# Patient Record
Sex: FEMALE | Race: WHITE | Employment: FULL TIME | URBAN - METROPOLITAN AREA
[De-identification: names, ages, dates, MRNs, and addresses within clinical notes are randomized per-mention and may not be internally consistent; named-entity substitution may affect disease eponyms.]

---

## 2021-10-21 ENCOUNTER — HOSPITAL ENCOUNTER (OUTPATIENT)
Dept: PHYSICAL THERAPY | Age: 41
Discharge: HOME | End: 2021-10-21
Payer: COMMERCIAL

## 2021-10-21 PROCEDURE — 97162 PT EVAL MOD COMPLEX 30 MIN: CPT

## 2021-10-21 PROCEDURE — 97535 SELF CARE MNGMENT TRAINING: CPT

## 2021-10-21 NOTE — PROGRESS NOTES
[facility] PHYSICAL THERAPY  [address] [place] [address] 51, [address] 201,Kya Casas, 70 [facility] - Phone: [phone]  Fax: [phone] / 23[facility]  Patient Name: Sanjuanita Santos : 1980   Medical Diagnosis: Right shoulder pain [M25.511] Treatment Diagnosis: Right shoulder pain [M25.511]   Onset Date:      Referral Source: Sejal Noble MD Start of Care [facility]): 10/21/2021   Prior Hospitalization: See medical history Provider #: [id number]   Prior Level of Function: WNL without limitations   Comorbidities: Allergies, headaches, HTN, chronicity of symptoms   Medications: Verified on Patient Summary List   The Plan of Care and following information is based on the information from the initial evaluation.   ===========================================================================================  Assessment / key information:  Patient is 42 y.o. female who presents to [facility]. with diagnosis of Right shoulder pain [M25.511]. Pt reports insidious onset R sh pain starting  with worsening over the last month. Pt is RHD. Objective data detailed below. Patient scored 46 on FOTO indicating decreased function and quality of life. Pt would benefit from skilled PT services to address impairments, work towards goals, and return to PLOF. Pain: current 1/10, at worst 8/10, at best 0/10  Aggravating factors: handling baggage when traveling, sleeping, reaching, zipping, reaching behind while in car, OH movement, vacuuming, holding phone  Alleviating factors: resting, topical analgesic  Pain description: shooting, sharp  Pain location: \"in the middle\" of the shoulder jt  Radiation? Numbness/tingling?  None reported    Static Posture: ant tilt with UR and ABD R scapula  GHJ AROM(PROM): flex R 136(155) L 150, ext R 42 L 50, ABD R 717(590) with reports of tightness and stiffness L 165, ER equal and functional bilaterally, IR WFL with firm end feel noted L IR (55)  GHJ MMT: L GHJ grossly WNL, R GHJ grossly 4 except ABD 3-  Elbow MMT: grossly WNL bilat  Palpation: TTP noted during palpation of UT, lev scap, subscap, LH biceps tendon R GHJ  ===========================================================================================  Eval Complexity: History MEDIUM  Complexity : 1-2 comorbidities / personal factors will impact the outcome/ POC ;  Examination  MEDIUM Complexity : 3 Standardized tests and measures addressing body structure, function, activity limitation and / or participation in recreation ; Presentation MEDIUM Complexity : Evolving with changing characteristics ; Decision Making MEDIUM Complexity : FOTO score of 26-74; Overall Complexity MEDIUM  Problem List: pain affecting function, decrease ROM, decrease strength, edema affecting function, decrease ADL/ functional abilitiies, decrease activity tolerance, decrease flexibility/ joint mobility and decrease transfer abilities  Treatment Plan may include any combination of the following: Therapeutic exercise, Therapeutic activities, Neuromuscular re-education, Physical agent/modality, Manual therapy, Patient education, Self Care training and Functional mobility training  Patient / Family readiness to learn indicated by: asking questions, trying to perform skills and interest  Persons(s) to be included in education: patient (P)  Barriers to Learning/Limitations: None  Measures taken, if barriers to learning:    Patient Goal (s): \"Strengthen and make lifestyle adjustments to prevent further complications\"   Patient self reported health status: good  Rehabilitation Potential: good   Short Term Goals: To be accomplished in 4 weeks:  1) Pt performing HEP to assist with appt carry over and return to PLOF. 2) Pt will report 50% improvement in ability to perform UE functional tasks, ADLs.   3) Pt to demo improved R GHJ ROM flex >150, ext >50, ABD >150, func WFL without reports of tightness to facilitate UE functional tasks, ADLs. 4) Pt to increase score on FOTO to 61 indicating improved function and quality of life. 5) Pt to report >=+3 on GROC indicating clinically significant subjective functional improvement.  Long Term Goals: To be accomplished in 8 weeks:  1) Patient Independent with progressive HEP to facilitate symptom management and progression upon DC. 2) Pt to increase score on FOTO to 70 indicating improved function and quality of life. 3) Pt to demo R GHJ MMT >=4 in order to have improved functional mobility. 4) Pt to report >=+5 on GROC indicating clinically significant subjective functional improvement. Frequency / Duration: Patient to be seen  1-2  times per week for 8  weeks:  Patient / Caregiver education and instruction: other PT diagnosis, prognosis, POC  Therapist Signature: Brendan Knight PT Date: 02/55/8753   Certification Period: na Time: 9:10 AM   ===========================================================================================  I certify that the above Physical Therapy Services are being furnished while the patient is under my care. I agree with the treatment plan and certify that this therapy is necessary. Physician Signature:        Date:       Time:                                        Laura Estes MD    Please sign and return to [facility] at [facility], [facility]. or you may fax the signed copy to [phone]. Thank you.

## 2021-10-21 NOTE — PROGRESS NOTES
PHYSICAL THERAPY - DAILY TREATMENT NOTE    Patient Name: Gaurang Sesay        Date: 10/21/2021  : 1980   yes Patient  Verified  Visit #:   1   of   16  Insurance: Payor: BLUE CROSS / Plan: [facility] PPO / Product Type: PPO /      In time: 904 am Out time: 928 am   Total Treatment Time: 24     Medicare/BCBS Time Tracking (below)   Total Timed Codes (min):  10 1:1 Treatment Time:  10     TREATMENT AREA =  Right shoulder pain [M25.511]    SUBJECTIVE  Pain Level (on 0 to 10 scale):  1 / 10   Medication Changes/New allergies or changes in medical history, any new surgeries or procedures?    no  If yes, update Summary List   Subjective Functional Status/Changes:  []  No changes reported     See POC       OBJECTIVE  10 min Self Care: See pt education   Rationale:    Pt education to improve the patient's ability to adhere to PT POC    Billed With/As:  [] TE  [] TA  [] Neuro  [x] Self Care Patient Education: [x] Review HEP    [] Progressed/Changed HEP based on:   [] positioning   [] body mechanics   [] transfers   [] heat/ice application    [x] other: PT diagnosis, prognosis, POC     Other Objective/Functional Measures:    See POC     Post Treatment Pain Level (on 0 to 10) scale:    10     ASSESSMENT  Assessment/Changes in Function:     Justification for Eval Code Complexity:  Patient History: Allergies, headaches, HTN, chronicity of symptoms  Examination: See exam  Clinical Presentation: evolving  Clinical Decision Making: MEDIUM  FOTO: 52 /100     []  See Progress Note/Recertification   Patient will continue to benefit from skilled PT services to modify and progress therapeutic interventions, address functional mobility deficits, address ROM deficits, address strength deficits, analyze and address soft tissue restrictions, analyze and cue movement patterns, analyze and modify body mechanics/ergonomics and assess and modify postural abnormalities to attain remaining goals. Progress toward goals / Updated goals:    See POC     PLAN  [x]  Upgrade activities as tolerated yes Continue plan of care   []  Discharge due to :    []  Other:      Therapist: Maddie Newman PT    Date: 10/21/2021 Time: 8:45 AM     No future appointments.

## 2021-10-28 ENCOUNTER — HOSPITAL ENCOUNTER (OUTPATIENT)
Dept: PHYSICAL THERAPY | Age: 41
Discharge: HOME | End: 2021-10-28
Payer: COMMERCIAL

## 2021-10-28 PROCEDURE — 97535 SELF CARE MNGMENT TRAINING: CPT

## 2021-10-28 PROCEDURE — 97110 THERAPEUTIC EXERCISES: CPT

## 2021-10-28 NOTE — PROGRESS NOTES
PHYSICAL THERAPY - DAILY TREATMENT NOTE    Patient Name: Merline Sham        Date: 10/28/2021  : 1980   yes Patient  Verified  Visit #:   2   of   16  Insurance: Payor: BLUE CROSS / Plan: [facility] PPO / Product Type: PPO /      In time: 3:30 Out time: 410   Total Treatment Time: 40     Medicare/BCBS Time Tracking (below)   Total Timed Codes (min):  40 1:1 Treatment Time:  40     TREATMENT AREA =  Right shoulder pain [M25.511]    SUBJECTIVE  Pain Level (on 0 to 10 scale):  0 / 10   Medication Changes/New allergies or changes in medical history, any new surgeries or procedures?    no  If yes, update Summary List   Subjective Functional Status/Changes:  []  No changes reported     Reports no changes since IE. Notes last time it was really painful was over labor day weekend while vacuuming. OBJECTIVE  30 min Therapeutic Exercise:  [x]  See flow sheet   Rationale:      increase ROM, increase strength and improve coordination to improve the patients ability to reach overhead. 10 min Self Care: Pt education on HEP and postures   Rationale:    increase strength and improve coordination to improve the patients ability to reach behind back and overhead. Billed With/As:  [] TE  [] TA  [] Neuro  [x] Self Care Patient Education: [x] Review HEP    [x] Progressed/Changed HEP based on:   [x] positioning   [x] body mechanics   [] transfers   [] heat/ice application    [] other:      Other Objective/Functional Measures:    See flow sheet for exercises performed     Post Treatment Pain Level (on 0 to 10) scale:   0  / 10     ASSESSMENT  Assessment/Changes in Function:     Chart reviewed and subjective taken. Pt required heavy TC for form due to first f/u session, however corrected quickly. Good tolerance to all therex and no c/o pain.  Given HEP to progress therapy at home.     []  See Progress Note/Recertification   Patient will continue to benefit from skilled PT services to modify and progress therapeutic interventions, address functional mobility deficits, address ROM deficits, address strength deficits, analyze and address soft tissue restrictions, analyze and cue movement patterns, analyze and modify body mechanics/ergonomics and assess and modify postural abnormalities to attain remaining goals. Progress toward goals / Updated goals:    Initiated HEP today.      PLAN  [x]  Upgrade activities as tolerated yes Continue plan of care   []  Discharge due to :    []  Other:      Therapist: Flaquita Galvez PT    Date: 10/28/2021 Time: 4:45 PM     Future Appointments   Date Time Provider Matthew Nascimento   10/28/2021  3:30 PM Victoria Ashley, PT [id number]   11/1/2021  8:45 AM Clarice Adam, PT [facility]   11/4/2021  8:30 AM [facility]   11/9/2021  9:30 AM Clarice Adam, PT [facility]   11/11/2021  8:00 AM Clarice Adam, PT [facility]   11/16/2021  9:30 AM Clarice Adam, PT [facility]   11/18/2021  8:30 AM Chandra Doan [facility]   11/23/2021  9:00 AM [facility]   11/26/2021 10:15 AM Victoria Ashley PT [facility]   11/30/2021  9:15 AM Afshan Fallcharley [facility]   12/2/2021  8:30 AM Chandra Doan [facility]   12/7/2021  8:30 AM [facility]   12/9/2021  9:45 AM Clarice Adam, [facility]   12/14/2021  9:45 AM Victoria Ashley, PT [facility]   12/16/2021  9:15 AM Abhishek Wolfe [facility]

## 2021-11-01 ENCOUNTER — HOSPITAL ENCOUNTER (OUTPATIENT)
Dept: PHYSICAL THERAPY | Age: 41
Discharge: HOME | End: 2021-11-01
Payer: COMMERCIAL

## 2021-11-01 PROCEDURE — 97110 THERAPEUTIC EXERCISES: CPT

## 2021-11-01 PROCEDURE — 97530 THERAPEUTIC ACTIVITIES: CPT

## 2021-11-01 PROCEDURE — 97535 SELF CARE MNGMENT TRAINING: CPT

## 2021-11-01 NOTE — PROGRESS NOTES
PHYSICAL THERAPY - DAILY TREATMENT NOTE    Patient Name: Annika Márquez        Date: 2021  : 1980   yes Patient  Verified  Visit #:   3   of   16  Insurance: Payor: BLUE CROSS / Plan: [facility] PPO / Product Type: PPO /      In time: 850 am Out time: 939 am   Total Treatment Time: 49     Medicare/BCBS Time Tracking (below)   Total Timed Codes (min):  39 1:1 Treatment Time:  39     TREATMENT AREA =  Right shoulder pain [M25.511]    SUBJECTIVE  Pain Level (on 0 to 10 scale):  0 / 10   Medication Changes/New allergies or changes in medical history, any new surgeries or procedures?    no  If yes, update Summary List   Subjective Functional Status/Changes:  []  No changes reported     Pt reports no pain this date, doing well since last session.        OBJECTIVE  Modalities Rationale: decrease edema, decrease inflammation and decrease pain to improve patient's ability to perform UE functional tasks and ADLs   min [] Estim, type/location:                                     []  att     []  unatt     []  w/US     []  w/ice    []  w/heat    min []  Mechanical Traction: type/lbs                   []  pro   []  sup   []  int   []  cont    []  before manual    []  after manual    min []  Ultrasound, settings/location:      min []  Iontophoresis w/ dexamethasone, location:                                               []  take home patch       []  in clinic   10 min [x]  Ice     []  Heat    location/position: To R shoulder s/p session in supine H/L    min []  Vasopneumatic Device, press/temp: Pre-tx girth:   Post-tx girth:   Measured at:     min []  Other:    [x] Skin assessment post-treatment (if applicable):    [x]  intact    []  redness- no adverse reaction     []redness  adverse reaction:        21 min Therapeutic Exercise:  [x]  See flow sheet   Rationale:      increase ROM and increase strength to improve the patient's ability to perform UE functional tasks and ADLs     8 min Therapeutic Activity: [x]  See flow sheet   Rationale:    increase ROM, improve coordination and increase proprioception to improve the patient's ability to perform UE functional tasks and ADLs    10 min Self Care: See pt education   Rationale:    Pt education to improve the patient's ability to perform exercises, adhere to PT POC    Billed With/As:  [] TE  [] TA  [] Neuro  [x] Self Care Patient Education: [x] Review HEP    [] Progressed/Changed HEP based on:   [x] positioning   [x] body mechanics   [] transfers   [] heat/ice application    [x] other: BP assess and education     Other Objective/Functional Measures:    Vitals sitting x5 mins: 98/64 mmHg     Post Treatment Pain Level (on 0 to 10) scale:   0  / 10     ASSESSMENT  Assessment/Changes in Function:     Pt reported lightheadedness during standing exercise. After brief seated rest break, pt reported symptom resolution, though BP measured as above. Instructed pt to inform me if she felt lightheaded again t/o appt. Pt verbalized understanding and it did not occur after the one incident. Cues given for UE alignment during pec str at wall and band exercises to ensure proper movement pattern.     []  See Progress Note/Recertification   Patient will continue to benefit from skilled PT services to modify and progress therapeutic interventions, address functional mobility deficits, address ROM deficits, address strength deficits, analyze and address soft tissue restrictions, analyze and cue movement patterns, analyze and modify body mechanics/ergonomics and assess and modify postural abnormalities to attain remaining goals. Progress toward goals / Updated goals:    · To be accomplished in 4 weeks:  1) Pt performing HEP to assist with appt carry over and return to PLOF. Pt reports adherence 11/1/21  2) Pt will report 50% improvement in ability to perform UE functional tasks, ADLs.   3) Pt to demo improved R GHJ ROM flex >150, ext >50, ABD >150, func WFL without reports of tightness to facilitate UE functional tasks, ADLs. 4) Pt to increase score on FOTO to 61 indicating improved function and quality of life. 5) Pt to report >=+3 on GROC indicating clinically significant subjective functional improvement. · To be accomplished in 8 weeks:  1) Patient Independent with progressive HEP to facilitate symptom management and progression upon DC. 2) Pt to increase score on FOTO to 70 indicating improved function and quality of life. 3) Pt to demo R GHJ MMT >=4 in order to have improved functional mobility. 4) Pt to report >=+5 on GROC indicating clinically significant subjective functional improvement.        PLAN  [x]  Upgrade activities as tolerated yes Continue plan of care   []  Discharge due to :    []  Other:      Therapist: Wilber Oleary, PT    Date: 11/1/2021 Time: 8:45 AM     Future Appointments   Date Time Provider Matthew Nascimento   11/4/2021  8:30 AM Jackie Hernandez   11/9/2021  9:30 AM Chetan Gudino,  [facility]   11/11/2021  8:00 AM [facility]   11/16/2021  9:30 AM Chetan Gudino,  [facility]   11/18/2021  8:30 AM Aiden BRYAN [address] [facility]   11/23/2021  9:00 AM Gary Grande 200 [facility]   11/26/2021 10:15 AM Makenna Belcher,  [facility]   11/30/2021  9:15 AM Leyla Valenzuela [facility]   12/2/2021  8:30 AM Aiden BRYAN [address] [facility]   12/7/2021  8:30 AM Gary Grande [address] [facility]   12/9/2021  9:45 AM Chetan Gudino,  [facility]   12/14/2021  9:45 AM Makenna Belcher,  [address] [facility]   12/16/2021  9:15 AM Gary Grande [address] [facility]

## 2021-11-03 ENCOUNTER — HOSPITAL ENCOUNTER (OUTPATIENT)
Dept: PHYSICAL THERAPY | Age: 41
Discharge: HOME | End: 2021-11-03
Payer: COMMERCIAL

## 2021-11-03 PROCEDURE — 97110 THERAPEUTIC EXERCISES: CPT

## 2021-11-03 NOTE — PROGRESS NOTES
PHYSICAL THERAPY - DAILY TREATMENT NOTE    Patient Name: Nicolas Jackman        Date: 11/3/2021  : 1980   yes Patient  Verified  Visit #:   4   of   16  Insurance: Payor: BLUE CROSS / Plan: [facility] PPO / Product Type: PPO /      In time: 10:19 Out time: 11:10   Total Treatment Time: 51     Medicare/BCBS Time Tracking (below)   Total Timed Codes (min):  41 1:1 Treatment Time:  41     TREATMENT AREA =  Right shoulder pain [M25.511]    SUBJECTIVE  Pain Level (on 0 to 10 scale):  0 / 10   Medication Changes/New allergies or changes in medical history, any new surgeries or procedures?    no  If yes, update Summary List   Subjective Functional Status/Changes:  []  No changes reported     Patient reports no pain in the R shoulder.        OBJECTIVE  Modalities Rationale:     decrease edema, decrease inflammation and decrease pain to improve patient's ability to perform UE functional tasks and ADLs   min [] Estim, type/location:                                      []  att     []  unatt     []  w/US     []  w/ice    []  w/heat    min []  Mechanical Traction: type/lbs                   []  pro   []  sup   []  int   []  cont    []  before manual    []  after manual    min []  Ultrasound, settings/location:      min []  Iontophoresis w/ dexamethasone, location:                                               []  take home patch       []  in clinic   10 Min [x]  Ice     []  Heat    Location/position: To R shoulder in supine with LE on wedge post-session    min []  Vasopneumatic Device, press/temp:    If using vaso (only need to measure limb vaso being performed on)      pre-treatment girth :       post-treatment girth :       measured at (landmark location) :      min []  Other:    [x] Skin assessment post-treatment (if applicable):    [x]  intact    []  redness- no adverse reaction                  []redness  adverse reaction:        41 min Therapeutic Exercise:  [x]  See flow sheet Rationale:      increase ROM and increase strength to improve the patient's ability to perform UE functional tasks and ADLs       Billed With/As:   [x] TE   [] TA   [] Neuro   [] Self Care Patient Education: [x] Review HEP:   MedBridge Access Code Date Issued   [facility] 10/28     [x] Progressed/Changed HEP based on:   [] Addressed barriers and behaviors     [] Therapeutic Neuroscience Pain Education, metaphor, reframing, contexts. [] Sleep Education   [] Body Mechanics [] Healing Timeframe     [] Self STM with ball at \"the spot\"  [] Walking Program/Global Activity   [] other:        Other Objective/Functional Measures:    *demo full R shoulder AROM in all directions, except FIR. R FIR limited to T10, L FIR T7.  *added supine TB scapular stability exercises 1-4   *noted discomfort in the L shoulder with retro UBE     Post Treatment Pain Level (on 0 to 10) scale:   0  / 10     ASSESSMENT  Assessment/Changes in Function:   Patient demo difficulty with R scapular setting, required tactile cues and return demonstration 90% of the time for proper form. []  See Progress Note/Recertification   Patient will continue to benefit from skilled PT services to modify and progress therapeutic interventions, address functional mobility deficits, address ROM deficits, address strength deficits, analyze and address soft tissue restrictions, analyze and cue movement patterns, analyze and modify body mechanics/ergonomics and assess and modify postural abnormalities to attain remaining goals. Progress toward goals / Updated goals:    · To be accomplished in 4 weeks since initial eval on 10/21:  1) Pt performing HEP to assist with appt carry over and return to PLOF. Pt reports adherence 11/1/21  2) Pt will report 50% improvement in ability to perform UE functional tasks, ADLs. 3) Pt to demo improved R GHJ ROM flex >150, ext >50, ABD >150, func WFL without reports of tightness to facilitate UE functional tasks, ADLs.  Met 11/03  4) Pt to increase score on FOTO to 61 indicating improved function and quality of life. 5) Pt to report >=+3 on GROC indicating clinically significant subjective functional improvement. · To be accomplished in 8 weeks since initial eval on 10/21:  1) Patient Independent with progressive HEP to facilitate symptom management and progression upon DC. 2) Pt to increase score on FOTO to 70 indicating improved function and quality of life. 3) Pt to demo R GHJ MMT >=4 in order to have improved functional mobility. 4) Pt to report >=+5 on GROC indicating clinically significant subjective functional improvement.        PLAN  [x]  Upgrade activities as tolerated yes Continue plan of care   []  Discharge due to :    []  Other:      Therapist: Luis A Rios    Date: 11/3/2021 Time: 11:20 AM     Future Appointments   Date Time Provider Matthew Nascimento   11/3/2021 10:15 AM [facility]   11/9/2021  9:30 AM Sheila Toro PT [facility]   11/11/2021  8:00 AM Sheila Toro PT [facility]   11/16/2021  9:30 AM Sheila Toro PT [facility]   11/18/2021  8:30 AM Sonny Bowens [facility]   11/23/2021  [facility]   11/26/2021 10:15 AM Chary Rider PT [facility]   11/30/2021  9:15 AM Ainsley Hotter [facility]   12/2/2021  8:30 AM Sonny BRYAN [facility]   12/7/2021  [facility]   12/9/2021  9:45 AM Sheila Toro PT [facility]   12/14/2021  9:45 AM Chary Rider, PT [facility]   12/16/2021  9:15 AM Daniel Dillon [facility]

## 2021-11-04 ENCOUNTER — APPOINTMENT (OUTPATIENT)
Dept: PHYSICAL THERAPY | Age: 41
End: 2021-11-04
Payer: COMMERCIAL

## 2021-11-09 ENCOUNTER — HOSPITAL ENCOUNTER (OUTPATIENT)
Dept: PHYSICAL THERAPY | Age: 41
Discharge: HOME | End: 2021-11-09
Payer: COMMERCIAL

## 2021-11-09 PROCEDURE — 97530 THERAPEUTIC ACTIVITIES: CPT

## 2021-11-09 PROCEDURE — 97535 SELF CARE MNGMENT TRAINING: CPT

## 2021-11-09 PROCEDURE — 97110 THERAPEUTIC EXERCISES: CPT

## 2021-11-09 NOTE — PROGRESS NOTES
PHYSICAL THERAPY - DAILY TREATMENT NOTE    Patient Name: Ariella Medina        Date: 2021  : 1980   yes Patient  Verified  Visit #:   5   of   16  Insurance: Payor: BLUE CROSS / Plan: [facility] [id number] PPO / Product Type: PPO /      In time: 933 am Out time: 1012 am   Total Treatment Time: 39     Medicare/BCBS Time Tracking (below)   Total Timed Codes (min):  39 1:1 Treatment Time:  39     TREATMENT AREA =  Right shoulder pain [M25.511]    SUBJECTIVE  Pain Level (on 0 to 10 scale):  0 / 10   Medication Changes/New allergies or changes in medical history, any new surgeries or procedures?    no  If yes, update Summary List   Subjective Functional Status/Changes:  []  No changes reported     Pt with no pain this date.        OBJECTIVE  Modalities Rationale: decrease edema, decrease inflammation and decrease pain to improve patient's ability to perform UE functional tasks and ADLs   min [] Estim, type/location:                                     []  att     []  unatt     []  w/US     []  w/ice    []  w/heat    min []  Mechanical Traction: type/lbs                   []  pro   []  sup   []  int   []  cont    []  before manual    []  after manual    min []  Ultrasound, settings/location:      min []  Iontophoresis w/ dexamethasone, location:                                               []  take home patch       []  in clinic   10 min [x]  Ice     []  Heat    location/position: To R shoulder s/p session    min []  Vasopneumatic Device, press/temp: Pre-tx girth:   Post-tx girth:   Measured at:     min []  Other:    [x] Skin assessment post-treatment (if applicable):    [x]  intact    []  redness- no adverse reaction     []redness  adverse reaction:        20 min Therapeutic Exercise:  [x]  See flow sheet   Rationale:      increase ROM and increase strength to improve the patient's ability to perform UE functional tasks and ADLs     10 min Therapeutic Activity: [x]  See flow sheet Rationale:    increase ROM, increase strength, improve coordination and increase proprioception to improve the patient's ability to perform UE functional tasks and ADLs    9 min Self Care: See pt education - performed during CP application   Rationale:    Pt education to improve the patient's ability to perform exercises, adhere to PT POC    Billed With/As:  [] TE  [] TA  [] Neuro  [x] Self Care Patient Education: [x] Review HEP    [] Progressed/Changed HEP based on:   [x] positioning   [x] body mechanics   [] transfers   [x] heat/ice application    [x] other: PT POC, functional diagonal patterns shoulder     Other Objective/Functional Measures:    Performed exercises per flow sheet     Post Treatment Pain Level (on 0 to 10) scale:   0  / 10     ASSESSMENT  Assessment/Changes in Function:     No adverse effects noted with treatment this date. Manual not indicated. Discussed at length during CP application PT POC, functional diagonal patterns shoulder, clinical reasoning behind exercises incorporated. Pt verbalized understanding. []  See Progress Note/Recertification   Patient will continue to benefit from skilled PT services to modify and progress therapeutic interventions, address functional mobility deficits, address ROM deficits, address strength deficits, analyze and address soft tissue restrictions, analyze and cue movement patterns, analyze and modify body mechanics/ergonomics and assess and modify postural abnormalities to attain remaining goals. Progress toward goals / Updated goals:    · To be accomplished in 4 weeks:  1) Pt performing HEP to assist with appt carry over and return to PLOF. Pt reports adherence 11/1/21  2) Pt will report 50% improvement in ability to perform UE functional tasks, ADLs. 3) Pt to demo improved R GHJ ROM flex >150, ext >50, ABD >150, func WFL without reports of tightness to facilitate UE functional tasks, ADLs.   4) Pt to increase score on FOTO to 61 indicating improved function and quality of life. 5) Pt to report >=+3 on GROC indicating clinically significant subjective functional improvement. · To be accomplished in 8 weeks:  1) Patient Independent with progressive HEP to facilitate symptom management and progression upon DC. 2) Pt to increase score on FOTO to 70 indicating improved function and quality of life. 3) Pt to demo R GHJ MMT >=4 in order to have improved functional mobility. 4) Pt to report >=+5 on GROC indicating clinically significant subjective functional improvement.        PLAN  [x]  Upgrade activities as tolerated yes Continue plan of care   []  Discharge due to :    []  Other:      Therapist: Wilber Oleary, PT    Date: 11/9/2021 Time: 9:30 AM     Future Appointments   Date Time Provider Matthew Nascimento   11/11/2021  8:00 AM [facility]   11/16/2021  9:30 AM Chetan [facility]udino [facility]   11/18/2021  8:30 AM [facility]   11/23/2021  9:00 AM [facility]   11/26/2021 10:15 AM Makenna Belcher PT [facility]   11/30/2021  9:15 AM Leyla Valenzuela [facility]   12/2/2021  8:30 AM [facility]   12/7/2021  8:30 AM [facility]   12/9/2021  9:45 AM Chetan Gudino PT [facility]   12/14/2021  9:45 AM Makenna Belcher PT [facility]   12/16/2021  9:15 AM [facility]

## 2021-11-11 ENCOUNTER — HOSPITAL ENCOUNTER (OUTPATIENT)
Dept: PHYSICAL THERAPY | Age: 41
Discharge: HOME | End: 2021-11-11
Payer: COMMERCIAL

## 2021-11-11 PROCEDURE — 97530 THERAPEUTIC ACTIVITIES: CPT

## 2021-11-11 PROCEDURE — 97110 THERAPEUTIC EXERCISES: CPT

## 2021-11-11 PROCEDURE — 97535 SELF CARE MNGMENT TRAINING: CPT

## 2021-11-11 NOTE — PROGRESS NOTES
PHYSICAL THERAPY - DAILY TREATMENT NOTE    Patient Name: Arelis De La Rosa        Date: 2021  : 1980   yes Patient  Verified  Visit #:      16  Insurance: Payor: BLUE CROSS / Plan: [facility] PPO / Product Type: PPO /      In time: 805 am Out time: 853 am   Total Treatment Time: 48     Medicare/BCBS Time Tracking (below)   Total Timed Codes (min):  38 1:1 Treatment Time:  38     TREATMENT AREA =  Right shoulder pain [M25.511]    SUBJECTIVE  Pain Level (on 0 to 10 scale):  0 / 10   Medication Changes/New allergies or changes in medical history, any new surgeries or procedures?    no  If yes, update Summary List   Subjective Functional Status/Changes:  []  No changes reported     Pt with no pain this date.        OBJECTIVE  Modalities Rationale: decrease edema, decrease inflammation and decrease pain to improve patient's ability to perform UE functional tasks and ADLs   min [] Estim, type/location:                                     []  att     []  unatt     []  w/US     []  w/ice    []  w/heat    min []  Mechanical Traction: type/lbs                   []  pro   []  sup   []  int   []  cont    []  before manual    []  after manual    min []  Ultrasound, settings/location:      min []  Iontophoresis w/ dexamethasone, location:                                               []  take home patch       []  in clinic   10 min [x]  Ice     []  Heat    location/position: To R shoulder s/p session    min []  Vasopneumatic Device, press/temp: Pre-tx girth:   Post-tx girth:   Measured at:     min []  Other:    [x] Skin assessment post-treatment (if applicable):    [x]  intact    []  redness- no adverse reaction     []redness  adverse reaction:        20 min Therapeutic Exercise:  [x]  See flow sheet   Rationale:      increase ROM and increase strength to improve the patient's ability to perform UE functional tasks and ADLs     10 min Therapeutic Activity: [x]  See flow sheet Rationale:    increase ROM, increase strength, improve coordination and increase proprioception to improve the patient's ability to perform UE functional tasks and ADLs    8 min Self Care: See pt education   Rationale:    Pt education to improve the patient's ability to perform exercises, adhere to PT POC    Billed With/As:  [] TE  [] TA  [] Neuro  [x] Self Care Patient Education: [x] Review HEP    [] Progressed/Changed HEP based on:   [x] positioning   [x] body mechanics   [] transfers   [x] heat/ice application    [x] other: cueing for Y T I addition and clinical reasoning behind importance of scap stabilization with UE movement     Other Objective/Functional Measures:    Performed exercises per flow sheet     Post Treatment Pain Level (on 0 to 10) scale:   0  / 10     ASSESSMENT  Assessment/Changes in Function:     No adverse effects noted with treatment this date. Additions tolerated. Pt due for PN next week, consider DC with progressive HEP at that time as she is without pain and adherent to HEP. []  See Progress Note/Recertification   Patient will continue to benefit from skilled PT services to modify and progress therapeutic interventions, address functional mobility deficits, address ROM deficits, address strength deficits, analyze and address soft tissue restrictions, analyze and cue movement patterns, analyze and modify body mechanics/ergonomics and assess and modify postural abnormalities to attain remaining goals. Progress toward goals / Updated goals:    · To be accomplished in 4 weeks:  1) Pt performing HEP to assist with appt carry over and return to PLOF. Pt reports adherence 11/1/21  2) Pt will report 50% improvement in ability to perform UE functional tasks, ADLs. 3) Pt to demo improved R GHJ ROM flex >150, ext >50, ABD >150, func WFL without reports of tightness to facilitate UE functional tasks, ADLs.   4) Pt to increase score on FOTO to 61 indicating improved function and quality of life. 5) Pt to report >=+3 on GROC indicating clinically significant subjective functional improvement. · To be accomplished in 8 weeks:  1) Patient Independent with progressive HEP to facilitate symptom management and progression upon DC. 2) Pt to increase score on FOTO to 70 indicating improved function and quality of life. 3) Pt to demo R GHJ MMT >=4 in order to have improved functional mobility. 4) Pt to report >=+5 on GROC indicating clinically significant subjective functional improvement.        PLAN  [x]  Upgrade activities as tolerated yes Continue plan of care   []  Discharge due to :    []  Other:      Therapist: Hayley Ocasio PT    Date: 11/11/2021 Time: 8:00 AM     Future Appointments   Date Time Provider Matthew Nascimento   11/16/2021  9:30 AM Veronica Herrera PT [facility]   11/18/2021  8:30 AM Ilda Walters [facility]   11/23/2021  9:00 AM Kade Connors [facility]   11/26/2021 10:15 AM Edson Laws PT [facility]   11/30/2021  9:15 AM Ilda Walters [facility]   12/2/2021  8:30 AM Rylan BRYAN [facility]   12/7/2021  8:30 AM Kade Connors [facility]   12/9/2021 10:30 AM Edson Laws PT [facility]   12/14/2021  9:45 AM Edson Laws PT [facility]   12/16/2021  9:15 AM Kade Connors [facility]

## 2021-11-16 ENCOUNTER — HOSPITAL ENCOUNTER (OUTPATIENT)
Dept: PHYSICAL THERAPY | Age: 41
Discharge: HOME | End: 2021-11-16
Payer: COMMERCIAL

## 2021-11-16 PROCEDURE — 97530 THERAPEUTIC ACTIVITIES: CPT

## 2021-11-16 PROCEDURE — 97110 THERAPEUTIC EXERCISES: CPT

## 2021-11-16 PROCEDURE — 97112 NEUROMUSCULAR REEDUCATION: CPT

## 2021-11-16 NOTE — PROGRESS NOTES
PHYSICAL THERAPY - DAILY TREATMENT NOTE    Patient Name: Feliberto Cline        Date: 2021  : 1980   yes Patient  Verified  Visit #:     Insurance: Payor: Sania Minaya / Plan: [facility] PPO / Product Type: PPO /      In time: 935 am Out time: 1023 am   Total Treatment Time: 48     Medicare/BCBS Time Tracking (below)   Total Timed Codes (min):  38 1:1 Treatment Time:  38     TREATMENT AREA =  Right shoulder pain [M25.511]    SUBJECTIVE  Pain Level (on 0 to 10 scale):  0 / 10   Medication Changes/New allergies or changes in medical history, any new surgeries or procedures?    no  If yes, update Summary List   Subjective Functional Status/Changes:  []  No changes reported     Pt reports continued improvement.        OBJECTIVE  Modalities Rationale: decrease edema, decrease inflammation and decrease pain to improve patient's ability to perform UE functional tasks and ADLs   min [] Estim, type/location:                                     []  att     []  unatt     []  w/US     []  w/ice    []  w/heat    min []  Mechanical Traction: type/lbs                   []  pro   []  sup   []  int   []  cont    []  before manual    []  after manual    min []  Ultrasound, settings/location:      min []  Iontophoresis w/ dexamethasone, location:                                               []  take home patch       []  in clinic   10 min [x]  Ice     []  Heat    location/position: To R shoulder s/p session    min []  Vasopneumatic Device, press/temp: Pre-tx girth:   Post-tx girth:   Measured at:     min []  Other:    [x] Skin assessment post-treatment (if applicable):    [x]  intact    []  redness- no adverse reaction     []redness  adverse reaction:        20 min Therapeutic Exercise:  [x]  See flow sheet   Rationale:      increase ROM and increase strength to improve the patient's ability to perform UE functional tasks and ADLs     10 min Therapeutic Activity: [x]  See flow sheet   Rationale:    increase ROM, increase strength, improve coordination and increase proprioception to improve the patient's ability to perform UE functional tasks and ADLs    8 min Neuromuscular Re-ed: [x]  See flow sheet   Rationale:    increase ROM, increase strength, improve coordination and increase proprioception to improve the patient's ability to perform UE functional tasks and ADLs    Billed With/As:  [x] TE  [] TA  [] Neuro  [] Self Care Patient Education: [x] Review HEP    [] Progressed/Changed HEP based on:   [x] positioning   [x] body mechanics   [] transfers   [x] heat/ice application    [] other:     Other Objective/Functional Measures:    Performed exercises per flow sheet     Post Treatment Pain Level (on 0 to 10) scale:   0  / 10     ASSESSMENT  Assessment/Changes in Function:     No adverse effects noted with treatment this date. Additions tolerated. Pt due for PN next visit, consider dec freq vs DC with progressive HEP at that time as she is without pain, tolerating progressive strengthening, and adherent to HEP. []  See Progress Note/Recertification   Patient will continue to benefit from skilled PT services to modify and progress therapeutic interventions, address functional mobility deficits, address ROM deficits, address strength deficits, analyze and address soft tissue restrictions, analyze and cue movement patterns, analyze and modify body mechanics/ergonomics and assess and modify postural abnormalities to attain remaining goals. Progress toward goals / Updated goals:    · To be accomplished in 4 weeks:  1) Pt performing HEP to assist with appt carry over and return to PLOF. Pt reports adherence 11/1/21  2) Pt will report 50% improvement in ability to perform UE functional tasks, ADLs. 3) Pt to demo improved R GHJ ROM flex >150, ext >50, ABD >150, func WFL without reports of tightness to facilitate UE functional tasks, ADLs.   4) Pt to increase score on FOTO to 61 indicating improved function and quality of life. 5) Pt to report >=+3 on GROC indicating clinically significant subjective functional improvement. · To be accomplished in 8 weeks:  1) Patient Independent with progressive HEP to facilitate symptom management and progression upon DC. 2) Pt to increase score on FOTO to 70 indicating improved function and quality of life. 3) Pt to demo R GHJ MMT >=4 in order to have improved functional mobility. 4) Pt to report >=+5 on GROC indicating clinically significant subjective functional improvement.        PLAN  [x]  Upgrade activities as tolerated yes Continue plan of care   []  Discharge due to :    []  Other:      Therapist: Edward Aden PT    Date: 11/16/2021 Time: 9:30 AM     Future Appointments   Date Time Provider Matthew Nascimento   11/18/2021  8:30 AM Brittany Overton   11/23/2021  9:00 AM Be Murillo [facility]   11/26/2021 10:15 AM Jake Perez, PT [facility]   11/30/2021  9:15 AM Bertha Wamsutter [facility]   12/2/2021  8:30 AM Nikhil Eden [facility]   12/7/2021  8:30 AM Be Murillo [facility]   12/9/2021 10:30 AM Jake Perez, PT [facility]   12/14/2021  9:45 AM Jake Perez PT [facility]   12/16/2021  9:15 AM Be Murillo [facility]

## 2021-11-18 ENCOUNTER — HOSPITAL ENCOUNTER (OUTPATIENT)
Dept: PHYSICAL THERAPY | Age: 41
Discharge: HOME | End: 2021-11-18
Payer: COMMERCIAL

## 2021-11-18 PROCEDURE — 97110 THERAPEUTIC EXERCISES: CPT

## 2021-11-18 PROCEDURE — 97535 SELF CARE MNGMENT TRAINING: CPT

## 2021-11-18 NOTE — PROGRESS NOTES
201 [facility] PHYSICAL THERAPY  [address] [place] [address] Karen [address],[place], 70 [facility] - Phone: [phone]  Fax: [phone]  PROGRESS NOTE  Patient Name: Benjamin Arroyo : 1980   Treatment/Medical Diagnosis: Right shoulder pain [M25.511]   Referral Source: Deshawn Hutton MD     Date of Initial Visit: 10/21/21 Attended Visits: 8 Missed Visits: CX: 0  NS: 0     SUMMARY OF TREATMENT  Patient has attended 8 PT sessions, including an initial evaluation for R shoulder pain. PT interventions have included manual therapy, therapeutic exercises, patient education (emphasis on posture and body mechanics), and HEP to improve L shoulder mobility/flexibility and periscapular strength/stability. CP for pain control. CURRENT STATUS  Patient has made good gains with PT interventions thus far. Patient reports 60% overall improvement since beginning PT. Demonstrates good compliance with basic HEP. Pain in the last 2 weeks: current 0/10, at worst 0/10, at best 0/10  Symptom description: no pain, just discomfort   Aggravating factors: reaching to the side noted at the anterior R shoulder, prolonged sitting causing poor posture (rounded shoulders) occurring 5-7 days/week  Alleviating factors: exercises, stretches  Functional improvements: reaching behind her, reaching into the passenger seat, reaching OH, vacuuming   Objective findings: decrease scapular strength (gross 3+/5), decrease R GHJ strength (Flex, ext, IR 4/5; ER, ABD 3+/5), seated postural observation (before cues): slight forward shoulders and forward head    Goal/Measure of Progress Goal Met? 1. Patient Independent with progressive HEP to facilitate symptom management and progression upon DC. Status at Last Eval: I and compliant with basic HEP Current Status: Progressive HEP established progressing   2. Pt to increase score on FOTO to 70 indicating improved function and quality of life.    Status at Last Eval: 52/100 Current Status: 71/100 yes   3. Pt to demo R GHJ MMT >=4 in order to have improved functional mobility. Status at Last Eval: grossly 4 except ABD 3- Current Status: Flex, ext, IR 4/5  ER, ABD 3+/5 progressing   4. Pt to report >=+5 on GROC indicating clinically significant subjective functional improvement   Status at Last Eval: Goal established Current Status: +5 Yes       New Goals to be achieved in __8-10__  treatments:  1. Patient Independent with progressive HEP to facilitate symptom management and progression upon DC. Status at last PN: Progressive HEP established  2. Pt to demo R GHJ and periscapular musculature MMT >=4 in order to have improved functional mobility. Status at last PN: R GHJ MMT Flex, ext, IR 4/5, ER, ABD 3+/5; scapular strength: gross 3+/5  3. Patient to report </= to 3 days a week without anterior shoulder symptoms to denote improved postural awareness. Status at last PN: occurs 5-7 days/week    RECOMMENDATIONS  Recommend 1-2x/week for 8-10 treatments to assure compliance/understanding with progressive HEP, continue to reduce pain levels, and improve L shoulder mobility/flexibility and periscapular strength/stability to address remaining functional limitations and return to PLOF. Thank you for this referral.   If you have any questions/comments please contact us directly at [phone]. Thank you for allowing us to assist in the care of your patient. Therapist Signature: Curt Pritchard [place] Date: 11/18/2021    Susannah Marshall, PT, DPT Time: 2:05 PM   NOTE TO PHYSICIAN:  PLEASE COMPLETE THE ORDERS BELOW AND FAX TO   [facility] Physical Therapy: [phone]  If you are unable to process this request in 24 hours please contact our office: [phone]    ___ I have read the above report and request that my patient continue as recommended.   ___ I have read the above report and request that my patient continue therapy with the following changes/special instructions:_________________________________________________________   ___ I have read the above report and request that my patient be discharged from therapy.      Physician Signature:        Date:       Time:                                 Abril Nina MD

## 2021-11-18 NOTE — PROGRESS NOTES
PHYSICAL THERAPY - DAILY TREATMENT NOTE    Patient Name: Gustavo Jackson        Date: 2021  : 1980   yes Patient  Verified  Visit #:   8   of   16  Insurance: Payor: BLUE CROSS / Plan: [facility] PPO / Product Type: PPO /      In time: 8:35 Out time: 9:38   Total Treatment Time: 63     Medicare/BCBS Time Tracking (below)   Total Timed Codes (min):  53 1:1 Treatment Time:  53     TREATMENT AREA =  Right shoulder pain [M25.511]    SUBJECTIVE  Pain Level (on 0 to 10 scale): 0 / 10   Medication Changes/New allergies or changes in medical history, any new surgeries or procedures?    no  If yes, update Summary List   Subjective Functional Status/Changes:  []  No changes reported     SEE PN    Patient reports 60% overall improvement since beginning PT.    Pain in the last 2 weeks: current 0/10, at worst 0/10, at best 0/10  Symptom description: no pain, just discomfort   Aggravating factors: reaching to the side noted at the anterior R shoulder  Alleviating factors: exercises, stretches  Functional improvements: reaching behind her, reaching into the passenger seat, reaching OH, vacuuming              OBJECTIVE  Modalities Rationale:     decrease edema, decrease inflammation and decrease pain to improve patient's ability to perform UE functional tasks and ADLs   min [] Estim, type/location:                                      []  att     []  unatt     []  w/US     []  w/ice    []  w/heat    min []  Mechanical Traction: type/lbs                   []  pro   []  sup   []  int   []  cont    []  before manual    []  after manual    min []  Ultrasound, settings/location:      min []  Iontophoresis w/ dexamethasone, location:                                               []  take home patch       []  in clinic   10 Min [x]  Ice     []  Heat    Location/position: To R shoulder in supine with LE on wedge post-session    min []  Vasopneumatic Device, press/temp:    If using vaso (only need to measure limb vaso being performed on)      pre-treatment girth :       post-treatment girth :       measured at (landmark location) :      min []  Other:    [x] Skin assessment post-treatment (if applicable):    [x]  intact    []  redness- no adverse reaction                  []redness  adverse reaction:        23 min Therapeutic Exercise:  [x]  See flow sheet   Rationale:      increase ROM and increase strength to improve the patient's ability to perform UE functional tasks and ADLs       30 min Self Care: Reassessment. Issued and reviewed progressive, long-term HEP. Reviewed updated POC and goals. Rationale:  to improve understanding of current diagnosis with realistic expectation of PT to improve compliance/adherence and satisfaction. Billed With/As:   [x] TE   [] TA   [] Neuro   [] Self Care Patient Education: [x] Review HEP:   [email or web address] Access Code Date Issued   [facility] 10/28   [facility] 11/18     [x] Progressed/Changed HEP based on:   [] Addressed barriers and behaviors     [] Therapeutic Neuroscience Pain Education, metaphor, reframing, contexts. [] Sleep Education   [] Body Mechanics [] Healing Timeframe     [] Self STM with ball at \"the spot\"  [] Walking Program/Global Activity   [] other:        Other Objective/Functional Measures:    Access Code: [email or web address]  URL: [email or web address]  Date: 11/18/2021  Prepared by: Owen Hernandez    Program Notes  PERFORM ALL EXERCISES TO YOUR TOLERANCE. IF SHARP PAIN OR RED FLAGS OCCUR, IMMEDIATELY STOP EXERCISE.       Exercises  USE BLUE TUBING - Standing Bilateral Low Shoulder Row with Anchored Resistance - 1 x daily - 3-4 x weekly - 2 sets - 10 reps - 5 hold  USE GREEN TUBING - Shoulder Extension With Resistance - 1 x daily - 3-4 x weekly - 2 sets - 10 reps - 5 hold  ORANGE TUBING - Shoulder External Rotation with Anchored Resistance with Towel Under Elbow - 1 x daily - 3-4 x weekly - 10 reps - 3 hold  Shoulder ER Stretch in Abduction - 3-4 x daily - 7 x weekly - 3 reps - 30 hold  Push Up on Table - 3-4 x weekly - 2 sets - 10 reps  Serratus Activation at Wall - 2 x daily - 3-4 x weekly - 15 reps - 5 hold  USE GREEN BAND - Shoulder External Rotation and Scapular Retraction with Resistance - 1 x daily - 3 x weekly - 10 reps - 3 hold  GREEN BAND - Seated Shoulder Horizontal Abduction with Resistance - 1 x daily - 3-4 x weekly - 10 reps - 3 hold    FOTO: 71  GROC: +5    SEE PN     Post Treatment Pain Level (on 0 to 10) scale:   0  / 10     ASSESSMENT  Assessment/Changes in Function:     SEE PN     []  See Progress Note/Recertification   Patient will continue to benefit from skilled PT services to modify and progress therapeutic interventions, address functional mobility deficits, address ROM deficits, address strength deficits, analyze and address soft tissue restrictions, analyze and cue movement patterns, analyze and modify body mechanics/ergonomics and assess and modify postural abnormalities to attain remaining goals.    Progress toward goals / Updated goals:    SEE PN     PLAN  [x]  Upgrade activities as tolerated yes Continue plan of care   []  Discharge due to :    [x]  Other: Continue 1-2x/week for 4 weeks     Therapist: NEDA Mcallister    Date: 11/19/2021 Time: 1:53 PM     Future Appointments   Date Time Provider Matthew Nascimento   11/18/2021  8:30 AM Smitha Miramontes   11/23/2021  9:00 AM Valentina Melendez [facility]   11/26/2021 10:15 AM Kali Almodovar PT [facility]   11/30/2021  9:15 AM Gabrielle Sheriff [email or web address]   12/2/2021  8:30 AM Bonner Merlin M [email or web address]   12/7/2021  8:30 AM Valentina Melendez [email or web address]   12/9/2021 10:30 AM Kali Almodovar PT [facility]   12/14/2021  9:45 AM Kali Almodovar PT [facility]   12/16/2021  9:15 AM Valentina Melendez [facility]

## 2021-11-23 ENCOUNTER — APPOINTMENT (OUTPATIENT)
Dept: PHYSICAL THERAPY | Age: 41
End: 2021-11-23
Payer: COMMERCIAL

## 2021-11-26 ENCOUNTER — APPOINTMENT (OUTPATIENT)
Dept: PHYSICAL THERAPY | Age: 41
End: 2021-11-26
Payer: COMMERCIAL

## 2021-11-30 ENCOUNTER — HOSPITAL ENCOUNTER (OUTPATIENT)
Dept: PHYSICAL THERAPY | Age: 41
Discharge: HOME | End: 2021-11-30
Payer: COMMERCIAL

## 2021-11-30 PROCEDURE — 97110 THERAPEUTIC EXERCISES: CPT

## 2021-11-30 NOTE — PROGRESS NOTES
PHYSICAL THERAPY - DAILY TREATMENT NOTE    Patient Name: Matias Parish        Date: 2021  : 1980   yes Patient  Verified  Visit #:     Insurance: Payor: BLUE CROSS / Plan: [facility] PPO / Product Type: PPO /      In time: 9:17 Out time: 10:07   Total Treatment Time: 50     Medicare/BCBS Time Tracking (below)   Total Timed Codes (min):  40 1:1 Treatment Time:  40     TREATMENT AREA =  Right shoulder pain [M25.511]    SUBJECTIVE  Pain Level (on 0 to 10 scale): 0 / 10   Medication Changes/New allergies or changes in medical history, any new surgeries or procedures?    no  If yes, update Summary List   Subjective Functional Status/Changes:  []  No changes reported     Patient reports no changes in the R shoulder since LV. States that she pivoted on her knee and jumped on a trampoline with a 8 y/o over the Thanksgiving holiday so her knee has been bothering her. States that she has not done any of her HEP due to the holiday.        OBJECTIVE  Modalities Rationale:     decrease edema, decrease inflammation and decrease pain to improve patient's ability to perform UE functional tasks and ADLs   min [] Estim, type/location:                                      []  att     []  unatt     []  w/US     []  w/ice    []  w/heat    min []  Mechanical Traction: type/lbs                   []  pro   []  sup   []  int   []  cont    []  before manual    []  after manual    min []  Ultrasound, settings/location:      min []  Iontophoresis w/ dexamethasone, location:                                               []  take home patch       []  in clinic   10 Min [x]  Ice     []  Heat    Location/position: To R shoulder in supine with LE on wedge post-session    min []  Vasopneumatic Device, press/temp:    If using vaso (only need to measure limb vaso being performed on)      pre-treatment girth :       post-treatment girth :       measured at (landmark location) :      min []  Other: [x] Skin assessment post-treatment (if applicable):    [x]  intact    []  redness- no adverse reaction                  []redness  adverse reaction:        40 min Therapeutic Exercise:  [x]  See flow sheet   Rationale:      increase ROM and increase strength to improve the patient's ability to perform UE functional tasks and ADLs. Billed With/As:   [x] TE   [] TA   [] Neuro   [] Self Care Patient Education: [x] Review HEP:   MedBridge Access Code Date Issued   [facility] 10/28   [facility] 11/18     [x] Progressed/Changed HEP based on:   [] Addressed barriers and behaviors     [] Therapeutic Neuroscience Pain Education, metaphor, reframing, contexts. [] Sleep Education   [] Body Mechanics [] Healing Timeframe     [] Self STM with ball at \"the spot\"  [] Walking Program/Global Activity   [] other:        Other Objective/Functional Measures:    *warm up on UBE followed by standing resistance exercises then table scapular strengthening exercises. *added miniband with SA slides and added standing DB lateral raises to improve shoulder strength/stability   *quick to fatigue with PREs, however denied sharp pain or red flags. Post Treatment Pain Level (on 0 to 10) scale:   0  / 10     ASSESSMENT  Assessment/Changes in Function:     Patient demo minimal UT compensation and UT discomfort during DB lateral raises - performed against wall for tactile cues. Able to perform exercise without compensation. Patient required min verbal cues throughout PT session for scapular engagement vs other compensatory strategies (ie: tricep or UT). Educated pt on importance with HEP compliance in order to progress towards PT goals and personal goals. Patient acknowledged understanding.       []  See Progress Note/Recertification   Patient will continue to benefit from skilled PT services to modify and progress therapeutic interventions, address functional mobility deficits, address ROM deficits, address strength deficits, analyze and address soft tissue restrictions, analyze and cue movement patterns, analyze and modify body mechanics/ergonomics and assess and modify postural abnormalities to attain remaining goals. Progress toward goals / Updated goals:    New Goals to be achieved in __8-10__  treatments since last PN on 11/18:  1. Patient Independent with progressive HEP to facilitate symptom management and progression upon DC. Status at last PN: Progressive HEP established  2. Pt to demo R GHJ and periscapular musculature MMT >=4 in order to have improved functional mobility. Status at last PN: R GHJ MMT Flex, ext, IR 4/5, ER, ABD 3+/5; scapular strength: gross 3+/5 progressing 11/30 indicated by ability to complete exercises without pain   3. Patient to report </= to 3 days a week without anterior shoulder symptoms to denote improved postural awareness.                     Status at last PN: occurs 5-7 days/week     PLAN  [x]  Upgrade activities as tolerated yes Continue plan of care   []  Discharge due to :    []  Other:      Therapist: Valeria Vargas    Date: 11/19/2021 Time: 10:23 AM     Future Appointments   Date Time Provider Matthew Nascimento   11/30/2021  9:15 AM Chivo Marin [facility]   12/7/2021  8:30 AM Josh King [facility]   12/9/2021 10:30 AM Sarkis Bustillos PT [facility]   12/14/2021  9:45 AM Sarkis Bustillos PT [facility]   12/16/2021  9:15 AM Elidia García

## 2021-12-02 ENCOUNTER — APPOINTMENT (OUTPATIENT)
Dept: PHYSICAL THERAPY | Age: 41
End: 2021-12-02
Payer: COMMERCIAL

## 2021-12-06 ENCOUNTER — HOSPITAL ENCOUNTER (OUTPATIENT)
Dept: PHYSICAL THERAPY | Age: 41
Discharge: HOME | End: 2021-12-06
Payer: COMMERCIAL

## 2021-12-06 PROCEDURE — 97110 THERAPEUTIC EXERCISES: CPT

## 2021-12-06 NOTE — PROGRESS NOTES
PHYSICAL THERAPY - DAILY TREATMENT NOTE    Patient Name: Matias Parish        Date: 2021  : 1980   yes Patient  Verified  Visit #:   10   of   16  Insurance: Payor: Denia Kam / Plan: [facility] PPO / Product Type: PPO /      In time: 8:00 Out time: 8:55   Total Treatment Time: 55     Medicare/BCBS Time Tracking (below)   Total Timed Codes (min):  45 1:1 Treatment Time:  45     TREATMENT AREA =  Right shoulder pain [M25.511]    SUBJECTIVE  Pain Level (on 0 to 10 scale): 0 / 10   Medication Changes/New allergies or changes in medical history, any new surgeries or procedures?    no  If yes, update Summary List   Subjective Functional Status/Changes:  []  No changes reported     Patient reports no pain over the weekend.         OBJECTIVE  Modalities Rationale:     decrease edema, decrease inflammation and decrease pain to improve patient's ability to perform UE functional tasks and ADLs   min [] Estim, type/location:                                      []  att     []  unatt     []  w/US     []  w/ice    []  w/heat    min []  Mechanical Traction: type/lbs                   []  pro   []  sup   []  int   []  cont    []  before manual    []  after manual    min []  Ultrasound, settings/location:      min []  Iontophoresis w/ dexamethasone, location:                                               []  take home patch       []  in clinic   10 Min [x]  Ice     []  Heat    Location/position: To R shoulder in supine with LE on wedge post-session    min []  Vasopneumatic Device, press/temp:    If using vaso (only need to measure limb vaso being performed on)      pre-treatment girth :       post-treatment girth :       measured at (landmark location) :      min []  Other:    [x] Skin assessment post-treatment (if applicable):    [x]  intact    []  redness- no adverse reaction                  []redness  adverse reaction:        45 min Therapeutic Exercise:  [x]  See flow sheet Rationale:      increase ROM and increase strength to improve the patient's ability to perform UE functional tasks and ADLs. Billed With/As:   [x] TE   [] TA   [] Neuro   [] Self Care Patient Education: [x] Review HEP:   MedBridge Access Code Date Issued   [facility] 10/28   [facility] 11/18     [x] Progressed/Changed HEP based on:   [] Addressed barriers and behaviors     [] Therapeutic Neuroscience Pain Education, metaphor, reframing, contexts. [] Sleep Education   [] Body Mechanics [] Healing Timeframe     [] Self STM with ball at \"the spot\"  [] Walking Program/Global Activity   [] other:        Other Objective/Functional Measures:    Seated BP: 95/64 mmHg      Post Treatment Pain Level (on 0 to 10) scale:  0  / 10     ASSESSMENT  Assessment/Changes in Function:     Patient began noticing light headedness following standing exercises. Pt took seated rest breaks before taking vitals, pt stated she thinks it's from her BP medication. Pt was advised to inform therapist if lightheadedness occurs throughout PT session. Pt acknowledged understanding and only noticed 1 incident following taking BP. seated rest break prior to completing today's session. Advised pt to follow up with MD on symptoms in regards to medication and exercise. Pt acknowledged understanding      []  See Progress Note/Recertification   Patient will continue to benefit from skilled PT services to modify and progress therapeutic interventions, address functional mobility deficits, address ROM deficits, address strength deficits, analyze and address soft tissue restrictions, analyze and cue movement patterns, analyze and modify body mechanics/ergonomics and assess and modify postural abnormalities to attain remaining goals. Progress toward goals / Updated goals:    New Goals to be achieved in __8-10__  treatments since last PN on 11/18:  1. Patient Independent with progressive HEP to facilitate symptom management and progression upon DC. Status at last PN: Progressive HEP established  2. Pt to demo R GHJ and periscapular musculature MMT >=4 in order to have improved functional mobility. Status at last PN: R GHJ MMT Flex, ext, IR 4/5, ER, ABD 3+/5; scapular strength: gross 3+/5 progressing 11/30 indicated by ability to complete exercises without pain   3. Patient to report </= to 3 days a week without anterior shoulder symptoms to denote improved postural awareness.                     Status at last PN: occurs 5-7 days/week     PLAN  [x]  Upgrade activities as tolerated yes Continue plan of care   []  Discharge due to :    []  Other:      Therapist: Michael Zayas    Date: 11/19/2021 Time: 9:50 AM     Future Appointments   Date Time Provider Matthew Nascimento   12/16/2021  9:15 AM Oscar Paige

## 2021-12-07 ENCOUNTER — APPOINTMENT (OUTPATIENT)
Dept: PHYSICAL THERAPY | Age: 41
End: 2021-12-07
Payer: COMMERCIAL

## 2021-12-09 ENCOUNTER — APPOINTMENT (OUTPATIENT)
Dept: PHYSICAL THERAPY | Age: 41
End: 2021-12-09
Payer: COMMERCIAL

## 2021-12-14 ENCOUNTER — APPOINTMENT (OUTPATIENT)
Dept: PHYSICAL THERAPY | Age: 41
End: 2021-12-14
Payer: COMMERCIAL

## 2021-12-16 ENCOUNTER — HOSPITAL ENCOUNTER (OUTPATIENT)
Dept: PHYSICAL THERAPY | Age: 41
Discharge: HOME | End: 2021-12-16
Payer: COMMERCIAL

## 2021-12-16 PROCEDURE — 97110 THERAPEUTIC EXERCISES: CPT

## 2021-12-16 PROCEDURE — 97535 SELF CARE MNGMENT TRAINING: CPT

## 2021-12-16 NOTE — PROGRESS NOTES
PHYSICAL THERAPY - DAILY TREATMENT NOTE    Patient Name: Zenobia Davis        Date: 2021  : 1980   yes Patient  Verified  Visit #:     Insurance: Payor: BLUE CROSS / Plan: [facility] PPO / Product Type: PPO /      In time: 9:20 Out time: 10:12   Total Treatment Time: 52     Medicare/BCBS Time Tracking (below)   Total Timed Codes (min):  42 1:1 Treatment Time:  42     TREATMENT AREA =  Right shoulder pain [M25.511]    SUBJECTIVE  Pain Level (on 0 to 10 scale): 0 / 10   Medication Changes/New allergies or changes in medical history, any new surgeries or procedures?    no  If yes, update Summary List   Subjective Functional Status/Changes:  []  No changes reported     SEE DC       OBJECTIVE  Modalities Rationale:     decrease edema, decrease inflammation and decrease pain to improve patient's ability to perform UE functional tasks and ADLs   min [] Estim, type/location:                                      []  att     []  unatt     []  w/US     []  w/ice    []  w/heat    min []  Mechanical Traction: type/lbs                   []  pro   []  sup   []  int   []  cont    []  before manual    []  after manual    min []  Ultrasound, settings/location:      min []  Iontophoresis w/ dexamethasone, location:                                               []  take home patch       []  in clinic   10 Min [x]  Ice     []  Heat    Location/position: To R shoulder in supine with LE on wedge post-session    min []  Vasopneumatic Device, press/temp:    If using vaso (only need to measure limb vaso being performed on)      pre-treatment girth :       post-treatment girth :       measured at (landmark location) :      min []  Other:    [x] Skin assessment post-treatment (if applicable):    [x]  intact    []  redness- no adverse reaction                  []redness - adverse reaction:        32 min Therapeutic Exercise:  [x]  See flow sheet   Rationale:      increase ROM and increase strength to improve the patient's ability to perform UE functional tasks and ADLs. 10 min Self Care: Reassessment. Reviewed HEP and importance with HEP compliance to maximize gains and self manage symptoms. Rationale:  to improve understanding of current diagnosis with self management of symptoms in prep for D/C to home. Billed With/As:   [x] TE   [] TA   [] Neuro   [] Self Care Patient Education: [x] Review HEP:   [employer] Access Code Date Issued   [facility] 10/28   [facility] 11/18     [x] Progressed/Changed HEP based on:   [] Addressed barriers and behaviors     [] Therapeutic Neuroscience Pain Education, metaphor, reframing, contexts. [] Sleep Education   [] Body Mechanics [] Healing Timeframe     [] Self STM with ball at \"the spot\"  [] Walking Program/Global Activity   [] other:        Other Objective/Functional Measures:    SEE DC     Post Treatment Pain Level (on 0 to 10) scale:  0  / 10     ASSESSMENT  Assessment/Changes in Function:     SEE DC     []  See Progress Note/Recertification   Patient will continue to benefit from skilled PT services to modify and progress therapeutic interventions, address functional mobility deficits, address ROM deficits, address strength deficits, analyze and address soft tissue restrictions, analyze and cue movement patterns, analyze and modify body mechanics/ergonomics and assess and modify postural abnormalities to attain remaining goals. Progress toward goals / Updated goals:    SEE DC     PLAN  []  Upgrade activities as tolerated no Continue plan of care   [x]  Discharge due to : Completed PT program. Progressing/met all PT goals   []  Other:      Therapist: NEDA Macias    Date: 11/19/2021 Time: 10:17 AM     No future appointments.

## 2021-12-16 NOTE — PROGRESS NOTES
[facility] PHYSICAL THERAPY  [address] [place] [address] Drive, [facility] - Phone: [phone]  Fax: [phone] SUMMARY  Patient Name: Nelia Christianson : 1980   Treatment/Medical Diagnosis: Right shoulder pain [M25.511]   Referral Source: Yas Oliver MD     Date of Initial Visit: 10/21/21 Attended Visits: 11 Missed Visits: CX: 0  NS: 0     SUMMARY OF TREATMENT  Patient attended 11 PT sessions, including an initial evaluation for R shoulder pain. PT interventions have included manual therapy, therapeutic exercises, patient education (emphasis on posture and body mechanics), and HEP to improve L shoulder mobility/flexibility and periscapular strength/stability. CP for pain control. CURRENT STATUS  Patient made good gains with PT interventions for R shoulder pain. Patient reports 90% overall improvement since beginning PT. Patient reports no pain in the last 2 weeks and has been able to complete all ADLs and household activities. Reports HEP compliance has been limited due to work, however pt is able to demo good understanding of importance with HEP compliance in order to maximize gains made from PT as well as self manage symptoms. Patient is now discharged from physical therapy due to progressing/meeting all PT goals and demonstrating good understanding of self management of symptoms with HEP and modalities. Thank you for this referral.     Goal/Measure of Progress Goal Met? 1. Patient Independent with progressive HEP to facilitate symptom management and progression upon DC. Status at Last Eval: Progressive HEP established Current Status: Compliance variable due to work progressing   2. Pt to demo R GHJ and periscapular musculature MMT >=4 in order to have improved functional mobility. Status at Last Eval: R GHJ MMT Flex, ext, IR 4/5,   ER, ABD 3+/5; Current Status: 4/5 Yes   3.   Patient to report </= to 3 days a week without anterior shoulder symptoms to denote improved postural awareness. Status at Last Eval: occurs 5-7 days/week Current Status: No symptoms in the last 2 weeks yes       RECOMMENDATIONS  Discontinue therapy. Progressing towards or have reached established goals. Thank you for this referral.     If you have any questions/comments please contact us directly at [phone]. Thank you for allowing us to assist in the care of your patient.     Therapist Signature: NEDA Ya PT, DPT Date: 12/17/21     Time: 3:17 PM     Aguilar Gonzáles MD